# Patient Record
Sex: MALE | HISPANIC OR LATINO | Employment: OTHER | URBAN - METROPOLITAN AREA
[De-identification: names, ages, dates, MRNs, and addresses within clinical notes are randomized per-mention and may not be internally consistent; named-entity substitution may affect disease eponyms.]

---

## 2023-05-09 ENCOUNTER — APPOINTMENT (OUTPATIENT)
Dept: MRI IMAGING | Facility: HOSPITAL | Age: 48
End: 2023-05-09

## 2023-05-09 ENCOUNTER — APPOINTMENT (OUTPATIENT)
Dept: GENERAL RADIOLOGY | Facility: HOSPITAL | Age: 48
End: 2023-05-09

## 2023-05-09 ENCOUNTER — HOSPITAL ENCOUNTER (EMERGENCY)
Facility: HOSPITAL | Age: 48
Discharge: HOME | End: 2023-05-09
Attending: EMERGENCY MEDICINE | Admitting: EMERGENCY MEDICINE

## 2023-05-09 ENCOUNTER — APPOINTMENT (OUTPATIENT)
Dept: CT IMAGING | Facility: HOSPITAL | Age: 48
End: 2023-05-09

## 2023-05-09 VITALS
TEMPERATURE: 98.7 F | BODY MASS INDEX: 30.55 KG/M2 | OXYGEN SATURATION: 100 % | HEART RATE: 78 BPM | SYSTOLIC BLOOD PRESSURE: 145 MMHG | DIASTOLIC BLOOD PRESSURE: 101 MMHG | WEIGHT: 166.01 LBS | HEIGHT: 62 IN | RESPIRATION RATE: 20 BRPM

## 2023-05-09 DIAGNOSIS — G51.0 LEFT-SIDED BELL'S PALSY: Primary | ICD-10-CM

## 2023-05-09 LAB
ALBUMIN SERPL-MCNC: 4.4 G/DL (ref 3.5–5.2)
ALBUMIN/GLOB SERPL: 1.4 G/DL
ALP SERPL-CCNC: 138 U/L (ref 39–117)
ALT SERPL W P-5'-P-CCNC: 45 U/L (ref 1–41)
ANION GAP SERPL CALCULATED.3IONS-SCNC: 9.4 MMOL/L (ref 5–15)
APTT PPP: 33.5 SECONDS (ref 24.2–34.2)
AST SERPL-CCNC: 29 U/L (ref 1–40)
BASOPHILS # BLD AUTO: 0.07 10*3/MM3 (ref 0–0.2)
BASOPHILS NFR BLD AUTO: 1.1 % (ref 0–1.5)
BILIRUB SERPL-MCNC: 0.5 MG/DL (ref 0–1.2)
BILIRUB UR QL STRIP: NEGATIVE
BUN SERPL-MCNC: 9 MG/DL (ref 6–20)
BUN/CREAT SERPL: 11.3 (ref 7–25)
CALCIUM SPEC-SCNC: 9.8 MG/DL (ref 8.6–10.5)
CHLORIDE SERPL-SCNC: 102 MMOL/L (ref 98–107)
CLARITY UR: ABNORMAL
CO2 SERPL-SCNC: 26.6 MMOL/L (ref 22–29)
COLOR UR: YELLOW
CREAT SERPL-MCNC: 0.8 MG/DL (ref 0.76–1.27)
DEPRECATED RDW RBC AUTO: 36.5 FL (ref 37–54)
EGFRCR SERPLBLD CKD-EPI 2021: 109.2 ML/MIN/1.73
EOSINOPHIL # BLD AUTO: 0.74 10*3/MM3 (ref 0–0.4)
EOSINOPHIL NFR BLD AUTO: 12 % (ref 0.3–6.2)
ERYTHROCYTE [DISTWIDTH] IN BLOOD BY AUTOMATED COUNT: 11.9 % (ref 12.3–15.4)
GLOBULIN UR ELPH-MCNC: 3.1 GM/DL
GLUCOSE BLDC GLUCOMTR-MCNC: 110 MG/DL (ref 70–99)
GLUCOSE SERPL-MCNC: 107 MG/DL (ref 65–99)
GLUCOSE UR STRIP-MCNC: NEGATIVE MG/DL
HCT VFR BLD AUTO: 44.7 % (ref 37.5–51)
HGB BLD-MCNC: 16.1 G/DL (ref 13–17.7)
HGB UR QL STRIP.AUTO: NEGATIVE
HOLD SPECIMEN: NORMAL
HOLD SPECIMEN: NORMAL
IMM GRANULOCYTES # BLD AUTO: 0.01 10*3/MM3 (ref 0–0.05)
IMM GRANULOCYTES NFR BLD AUTO: 0.2 % (ref 0–0.5)
INR PPP: 1.02 (ref 0.86–1.15)
KETONES UR QL STRIP: NEGATIVE
LEUKOCYTE ESTERASE UR QL STRIP.AUTO: NEGATIVE
LYMPHOCYTES # BLD AUTO: 2.03 10*3/MM3 (ref 0.7–3.1)
LYMPHOCYTES NFR BLD AUTO: 32.9 % (ref 19.6–45.3)
MCH RBC QN AUTO: 30.6 PG (ref 26.6–33)
MCHC RBC AUTO-ENTMCNC: 36 G/DL (ref 31.5–35.7)
MCV RBC AUTO: 85 FL (ref 79–97)
MONOCYTES # BLD AUTO: 0.31 10*3/MM3 (ref 0.1–0.9)
MONOCYTES NFR BLD AUTO: 5 % (ref 5–12)
NEUTROPHILS NFR BLD AUTO: 3.01 10*3/MM3 (ref 1.7–7)
NEUTROPHILS NFR BLD AUTO: 48.8 % (ref 42.7–76)
NITRITE UR QL STRIP: NEGATIVE
NRBC BLD AUTO-RTO: 0 /100 WBC (ref 0–0.2)
PH UR STRIP.AUTO: 8 [PH] (ref 5–8)
PLATELET # BLD AUTO: 300 10*3/MM3 (ref 140–450)
PMV BLD AUTO: 9.7 FL (ref 6–12)
POTASSIUM SERPL-SCNC: 4 MMOL/L (ref 3.5–5.2)
PROT SERPL-MCNC: 7.5 G/DL (ref 6–8.5)
PROT UR QL STRIP: NEGATIVE
PROTHROMBIN TIME: 13.5 SECONDS (ref 11.8–14.9)
QT INTERVAL: 377 MS
RBC # BLD AUTO: 5.26 10*6/MM3 (ref 4.14–5.8)
SODIUM SERPL-SCNC: 138 MMOL/L (ref 136–145)
SP GR UR STRIP: 1.01 (ref 1–1.03)
TROPONIN T SERPL HS-MCNC: 7 NG/L
UROBILINOGEN UR QL STRIP: ABNORMAL
WBC NRBC COR # BLD: 6.17 10*3/MM3 (ref 3.4–10.8)
WHOLE BLOOD HOLD COAG: NORMAL
WHOLE BLOOD HOLD SPECIMEN: NORMAL

## 2023-05-09 PROCEDURE — 99222 1ST HOSP IP/OBS MODERATE 55: CPT

## 2023-05-09 PROCEDURE — 85610 PROTHROMBIN TIME: CPT | Performed by: EMERGENCY MEDICINE

## 2023-05-09 PROCEDURE — 81003 URINALYSIS AUTO W/O SCOPE: CPT | Performed by: EMERGENCY MEDICINE

## 2023-05-09 PROCEDURE — 85730 THROMBOPLASTIN TIME PARTIAL: CPT | Performed by: EMERGENCY MEDICINE

## 2023-05-09 PROCEDURE — 85025 COMPLETE CBC W/AUTO DIFF WBC: CPT | Performed by: EMERGENCY MEDICINE

## 2023-05-09 PROCEDURE — 82948 REAGENT STRIP/BLOOD GLUCOSE: CPT

## 2023-05-09 PROCEDURE — 80053 COMPREHEN METABOLIC PANEL: CPT | Performed by: EMERGENCY MEDICINE

## 2023-05-09 PROCEDURE — 36415 COLL VENOUS BLD VENIPUNCTURE: CPT

## 2023-05-09 PROCEDURE — 97161 PT EVAL LOW COMPLEX 20 MIN: CPT | Performed by: PHYSICAL THERAPIST

## 2023-05-09 PROCEDURE — 71045 X-RAY EXAM CHEST 1 VIEW: CPT

## 2023-05-09 PROCEDURE — 84484 ASSAY OF TROPONIN QUANT: CPT | Performed by: EMERGENCY MEDICINE

## 2023-05-09 PROCEDURE — 70551 MRI BRAIN STEM W/O DYE: CPT

## 2023-05-09 PROCEDURE — 70450 CT HEAD/BRAIN W/O DYE: CPT

## 2023-05-09 PROCEDURE — 99284 EMERGENCY DEPT VISIT MOD MDM: CPT

## 2023-05-09 PROCEDURE — 93005 ELECTROCARDIOGRAM TRACING: CPT | Performed by: EMERGENCY MEDICINE

## 2023-05-09 RX ORDER — METHYLPREDNISOLONE 4 MG/1
TABLET ORAL
Qty: 21 TABLET | Refills: 0 | Status: SHIPPED | OUTPATIENT
Start: 2023-05-09

## 2023-05-09 RX ORDER — VALACYCLOVIR HYDROCHLORIDE 1 G/1
1000 TABLET, FILM COATED ORAL 3 TIMES DAILY
Qty: 21 TABLET | Refills: 0 | Status: SHIPPED | OUTPATIENT
Start: 2023-05-09

## 2023-05-09 RX ORDER — SODIUM CHLORIDE 0.9 % (FLUSH) 0.9 %
10 SYRINGE (ML) INJECTION AS NEEDED
Status: DISCONTINUED | OUTPATIENT
Start: 2023-05-09 | End: 2023-05-09 | Stop reason: HOSPADM

## 2023-05-09 NOTE — ED PROVIDER NOTES
Time: 3:06 PM EDT  Date of encounter:  5/9/2023  Independent Historian/Clinical History and Information was obtained by:   Patient and Family  Chief Complaint: Left facial droop    History is limited by: N/A    History of Present Illness:  Patient is a 48 y.o. year old male who presents to the emergency department for evaluation of left facial droop.  Patient reports he woke up this morning with a left-sided facial droop.  Patient states he went to bed last night in his normal state of good health.  Patient does complain of some pain involving his left ear and more so behind his left ear.  Patient states he also had COVID approximately 2 weeks ago.  Patient denies any trauma or injury.  Patient is he is never had anything like this before.  Patient reports his left eye is irritated.  He also states that he feels like he is not blinking properly.  Patient denies any change in taste.    HPI    Patient Care Team  Primary Care Provider: Provider, No Known    Past Medical History:     No Known Allergies  Past Medical History:   Diagnosis Date   • Seasonal allergies      Past Surgical History:   Procedure Laterality Date   • HAND SURGERY       History reviewed. No pertinent family history.    Home Medications:  Prior to Admission medications    Medication Sig Start Date End Date Taking? Authorizing Provider   brompheniramine-pseudoephedrine-DM (Bromfed DM) 30-2-10 MG/5ML syrup Take 10 mL by mouth 4 (Four) Times a Day As Needed for Congestion, Cough or Allergies. 4/16/23   Mirian Wong APRN        Social History:   Social History     Tobacco Use   • Smoking status: Never     Passive exposure: Never   • Smokeless tobacco: Never   Vaping Use   • Vaping Use: Never used   Substance Use Topics   • Alcohol use: Yes     Comment: occ   • Drug use: Never         Review of Systems:  Review of Systems   Constitutional: Negative for chills and fever.   HENT: Negative for congestion, ear pain and sore throat.    Eyes:  "Negative for pain.   Respiratory: Negative for cough, chest tightness and shortness of breath.    Cardiovascular: Negative for chest pain.   Gastrointestinal: Negative for abdominal pain, diarrhea, nausea and vomiting.   Genitourinary: Negative for flank pain and hematuria.   Musculoskeletal: Negative for joint swelling.   Skin: Negative for pallor.   Neurological: Positive for facial asymmetry. Negative for seizures and headaches.   All other systems reviewed and are negative.       Physical Exam:  BP (!) 145/101   Pulse 78   Temp 98.7 °F (37.1 °C) (Oral)   Resp 20   Ht 157.5 cm (62\")   Wt 75.3 kg (166 lb 0.1 oz)   SpO2 100%   BMI 30.36 kg/m²     Physical Exam       Vital signs were reviewed under triage note.  General appearance - Patient appears well-developed and well-nourished.  Patient is in no acute distress.  Head - Normocephalic, atraumatic.  Pupils - Equal, round, reactive to light.  Extraocular muscles are intact.  Conjunctive is clear.  Nasal - Normal inspection.  No evidence of trauma or epistaxis.  Tympanic membranes - Gray, intact without erythema or retractions.  Oral mucosa - Pink and moist without lesions or erythema.  Uvula is midline.  Chest wall - Atraumatic.  Chest wall is nontender.  There is no vesicular rashes noted.  Neck - Supple.  Trachea was midline.  There is no palpable lymphadenopathy or thyromegaly.  There are no meningeal signs  Lungs - Clear to auscultation and percussion bilaterally.  Heart - Regular rate and rhythm without any murmurs, clicks, or gallops.  Abdomen - Soft.  Bowel sounds are present.  There is no palpable tenderness.  There is no rebound, guarding, or rigidity.  There are no palpable masses.  There are no pulsatile masses.  Back - Spine is straight and midline.  There is no CVA tenderness.  Extremities - Intact x4 with full range of motion.  There is no palpable edema.  Pulses are intact x4 and equal.  Neurologic - Patient is awake, alert, and oriented x3.  " Cranial nerves II through XII are grossly intact with the exception that patient has a left facial droop.  Patient is unable to wrinkle his left forehead.  Patient has a slow blink but is able to close his eyelids all the way.  Motor and sensory functions grossly intact.  Cerebellar function was normal.  Integument - There are no rashes.  There are no petechia or purpura lesions noted.  There are no vesicular lesions noted.      Procedures:  Procedures      Medical Decision Making:      Comorbidities that affect care:    Seasonal allergies    External Notes reviewed:    Previous Clinic Note: Urgent care note from 4/16/2023 was reviewed by me.      The following orders were placed and all results were independently analyzed by me:  Orders Placed This Encounter   Procedures   • CT Head Without Contrast Stroke Protocol   • XR Chest 1 View   • MRI Brain Without Contrast   • Loring Draw   • Comprehensive Metabolic Panel   • Protime-INR   • aPTT   • Single High Sensitivity Troponin T   • Urinalysis With Microscopic If Indicated (No Culture) - Urine, Clean Catch   • CBC Auto Differential   • NPO Diet NPO Type: Strict NPO   • Initiate Department's Acute Stroke Process (Team D, Code 19, etc)   • Perform NIH Stroke Scale   • Measure Actual Weight   • Head of Bed 30 Degrees or Less   • Undress and Gown   • Cardiac Monitoring   • Continuous Pulse Oximetry   • Vital Signs   • Neuro Checks   • Notify MD for SBP < 80 or > 200   • Notify Provider for SBP greater than 140 if hemorrhagic Stroke   • No Hypotonic Fluids   • Nursing Swallow Assessment   • Oxygen Therapy- Nasal Cannula; 2 LPM; Titrate for SPO2: equal to or greater than, 94%   • POC Glucose Once   • POC Glucose Once   • ECG 12 Lead ED Triage Standing Order; Acute Stroke (Onset <12 hrs)   • Type & Screen   • Insert Large Bore Peripheral IV - Right AC Preferred   • CBC & Differential   • Green Top (Gel)   • Lavender Top   • Gold Top - SST   • Light Blue Top        Medications Given in the Emergency Department:  Medications   sodium chloride 0.9 % flush 10 mL (has no administration in time range)        ED Course:    ED Course as of 05/10/23 2146   Wed May 10, 2023   2144 EKG performed at 1120 was interpreted by me to show normal sinus rhythm with a ventricular rate of 76 bpm.  The parable is 140 ms.  P waves are normal.  QRS interval is normal.  Axis is at 13 degrees.  There is no acute ischemic ST or T wave change identified.  QT corrected was 426 ms. [TB]      ED Course User Index  [TB] Jaxson Rachel DO   The patient was seen and evaluated in the ED by me.  The above history and physical exam was performed as documented.  Diagnostic data was obtained.  Results reviewed.  Discussed with the patient.  Patient came in through triage and was triaged as a stroke intervention.  Patient was seen by teleneurology.  Teleneurology placed a note.  They felt this was Bell's palsy but did recommend getting an MRI.  If the MRI was negative then treat as Bell's palsy and discharged home.  I agreed with the teleneurologist interpretation.  The plan was carried out.  MRI was negative.  Patient is stable for discharge home    Labs:    Lab Results (last 24 hours)     Procedure Component Value Units Date/Time    POC Glucose Once [[id number]]  (Abnormal) Collected: 05/09/23 1053    Specimen: Blood Updated: 05/09/23 1055     Glucose 110 mg/dL      Comment: Serial Number: [id number]:  [id number]       CBC & Differential [[id number]]  (Abnormal) Collected: 05/09/23 1108    Specimen: Blood Updated: 05/09/23 1114    Narrative:      The following orders were created for panel order CBC & Differential.  Procedure                               Abnormality         Status                     ---------                               -----------         ------                     CBC Auto Differential[[id number]]        Abnormal            Final result                 Please view results for these  tests on the individual orders.    Comprehensive Metabolic Panel [[id number]]  (Abnormal) Collected: 05/09/23 1108    Specimen: Blood Updated: 05/09/23 1136     Glucose 107 mg/dL      BUN 9 mg/dL      Creatinine 0.80 mg/dL      Sodium 138 mmol/L      Potassium 4.0 mmol/L      Comment: Slight hemolysis detected by analyzer. Results may be affected.        Chloride 102 mmol/L      CO2 26.6 mmol/L      Calcium 9.8 mg/dL      Total Protein 7.5 g/dL      Albumin 4.4 g/dL      ALT (SGPT) 45 U/L      AST (SGOT) 29 U/L      Comment: Slight hemolysis detected by analyzer. Results may be affected.        Alkaline Phosphatase 138 U/L      Total Bilirubin 0.5 mg/dL      Globulin 3.1 gm/dL      A/G Ratio 1.4 g/dL      BUN/Creatinine Ratio 11.3     Anion Gap 9.4 mmol/L      eGFR 109.2 mL/min/1.73     Narrative:      GFR Normal >60  Chronic Kidney Disease <60  Kidney Failure <15      Protime-INR [[id number]]  (Normal) Collected: 05/09/23 1108    Specimen: Blood Updated: 05/09/23 1134     Protime 13.5 Seconds      INR 1.02    Narrative:      Suggested Therapeutic Ranges For Oral Anticoagulant Therapy:  Level of Therapy                      INR Target Range  Standard Dose                            2.0-3.0  High Dose                                2.5-3.5  Patients not receiving anticoagulant  Therapy Normal Range                     0.86-1.15    aPTT [[id number]]  (Normal) Collected: 05/09/23 1108    Specimen: Blood Updated: 05/09/23 1134     PTT 33.5 seconds     Single High Sensitivity Troponin T [[id number]]  (Normal) Collected: 05/09/23 1108    Specimen: Blood Updated: 05/09/23 1133     HS Troponin T 7 ng/L     Narrative:      High Sensitive Troponin T Reference Range:  <10.0 ng/L- Negative Female for AMI  <15.0 ng/L- Negative Male for AMI  >=10 - Abnormal Female indicating possible myocardial injury.  >=15 - Abnormal Male indicating possible myocardial injury.   Clinicians would have to utilize clinical acumen, EKG, Troponin, and  serial changes to determine if it is an Acute Myocardial Infarction or myocardial injury due to an underlying chronic condition.         CBC Auto Differential [[id number]]  (Abnormal) Collected: 05/09/23 1108    Specimen: Blood Updated: 05/09/23 1114     WBC 6.17 10*3/mm3      RBC 5.26 10*6/mm3      Hemoglobin 16.1 g/dL      Hematocrit 44.7 %      MCV 85.0 fL      MCH 30.6 pg      MCHC 36.0 g/dL      RDW 11.9 %      RDW-SD 36.5 fl      MPV 9.7 fL      Platelets 300 10*3/mm3      Neutrophil % 48.8 %      Lymphocyte % 32.9 %      Monocyte % 5.0 %      Eosinophil % 12.0 %      Basophil % 1.1 %      Immature Grans % 0.2 %      Neutrophils, Absolute 3.01 10*3/mm3      Lymphocytes, Absolute 2.03 10*3/mm3      Monocytes, Absolute 0.31 10*3/mm3      Eosinophils, Absolute 0.74 10*3/mm3      Basophils, Absolute 0.07 10*3/mm3      Immature Grans, Absolute 0.01 10*3/mm3      nRBC 0.0 /100 WBC     Urinalysis With Microscopic If Indicated (No Culture) - Urine, Clean Catch [[id number]]  (Abnormal) Collected: 05/09/23 1205    Specimen: Urine, Clean Catch Updated: 05/09/23 1222     Color, UA Yellow     Appearance, UA Cloudy     pH, UA 8.0     Specific Gravity, UA 1.012     Glucose, UA Negative     Ketones, UA Negative     Bilirubin, UA Negative     Blood, UA Negative     Protein, UA Negative     Leuk Esterase, UA Negative     Nitrite, UA Negative     Urobilinogen, UA 0.2 E.U./dL    Narrative:      Urine microscopic not indicated.           Imaging:    MRI Brain Without Contrast    Result Date: 5/9/2023  PROCEDURE: MRI BRAIN WO CONTRAST  COMPARISON: None  INDICATIONS: LEFT SIDE FACIAL DROOP SINCE THIS AM      TECHNIQUE: A variety of imaging planes and parameters were utilized for visualization of suspected pathology.  Images were performed without contrast.  FINDINGS:  No restricted diffusion or abnormal susceptibility.  No edema or mass effect.  No signal abnormalities of the brain.  CSF spaces/ventricles within normal limits.   Cerebellar tonsils in normal position.  Pituitary normal in size.  Major intracranial flow voids present       No evidence of acute brain ischemia or other intracranial abnormality      EBONI KIRK MD       Electronically Signed and Approved By: EBONI KIRK MD on 5/09/2023 at 13:19             XR Chest 1 View    Result Date: 5/9/2023  PROCEDURE: XR CHEST 1 VW  COMPARISON: None  INDICATIONS: Acute Stroke Protocol (Onset < 12 hrs)  FINDINGS: Single view of the chest reveals no cardiac enlargement or pleural effusion.  There is no acute airspace disease.  Mediastinal contour unremarkable.  No definitive acute osseous abnormalities are seen on this single view.   IMPRESSION: No acute disease.   LUCIO CANO MD       Electronically Signed and Approved By: LUCIO CANO MD on 5/09/2023 at 11:42             CT Head Without Contrast Stroke Protocol    Result Date: 5/9/2023  PROCEDURE: CT HEAD WO CONTRAST STROKE PROTOCOL  COMPARISON:  None INDICATIONS: Neuro deficit, acute, stroke suspected,left facial droop  PROTOCOL:   Standard imaging protocol performed    RADIATION:   DLP: 1018.2mGy*cm   MA and/or KV was adjusted to minimize radiation dose.     TECHNIQUE: After obtaining the patient's consent, CT images were obtained without non-ionic intravenous contrast material.  FINDINGS:  The ventricles and CSF containing spaces are normal in size and configuration.  No intra or extra-axial mass lesions, fluid collections, or mass effect is seen.  No focal areas of low attenuation or acute intracranial hemorrhage are identified.  The paranasal sinuses and mastoid air cells are clear.       Normal noncontrast CT of the brain     Julio C Munoz MD       Electronically Signed and Approved By: Julio C Munoz MD on 5/09/2023 at 11:06                 Differential Diagnosis and Discussion:    Facial Pain/Swelling: Differential diagnosis includes but is not limited to temporal arteritis, intracranial tumors, neuralgias,  dental disease, ocular disease, TMJ syndrome, salivary gland disorders, sinusitis, cluster headaches, migraines, and psychogenic.    All labs were reviewed and interpreted by me.  All X-rays impressions were independently interpreted by me.  EKG was interpreted by me.  CT scan radiology impression was interpreted by me.  MRI impression was interpreted by me.     MDM         Patient Care Considerations:    Patient was evaluated for potential LVO.  This was worked up and ruled out by the teleneurologist. ANTIBIOTICS: I considered prescribing antibiotics as an outpatient however There is no evidence of acute bacterial infection.      Consultants/Shared Management Plan:    Consultant: I have discussed the case with Teleneurologist who agrees to consult on the patient.    Social Determinants of Health:    Patient has presented with family members who are responsible, reliable and will ensure follow up care.      Disposition and Care Coordination:    Discharged: I considered escalation of care by admitting this patient for observation, however the patient has improved and is suitable and  stable for discharge.    I have explained the patient´s condition, diagnoses and treatment plan based on the information available to me at this time. I have answered questions and addressed any concerns. The patient has a good  understanding of the patient´s diagnosis, condition, and treatment plan as can be expected at this point. The vital signs have been stable. The patient´s condition is stable and appropriate for discharge from the emergency department.      The patient will pursue further outpatient evaluation with the primary care physician or other designated or consulting physician as outlined in the discharge instructions. They are agreeable to this plan of care and follow-up instructions have been explained in detail. The patient has received these instructions in written format and have expressed an understanding of the  discharge instructions. The patient is aware that any significant change in condition or worsening of symptoms should prompt an immediate return to this or the closest emergency department or call to 911.  I have explained discharge medications and the need for follow up with the patient/caretakers. This was also printed in the discharge instructions. Patient was discharged with the following medications and follow up:      Medication List      New Prescriptions    methylPREDNISolone 4 MG dose pack  Commonly known as: MEDROL  Take as directed on package instructions.     valACYclovir 1000 MG tablet  Commonly known as: VALTREX  Take 1 tablet by mouth 3 (Three) Times a Day.           Where to Get Your Medications      These medications were sent to [id number] - [place], [place] - [address] AT [facility] - [phone] [facility] [phone]   [address] [place] [address]    Phone: [phone]   · methylPREDNISolone 4 MG dose pack  · valACYclovir 1000 MG tablet        Follow-up with your primary care provider in 10 to 14 days if symptoms or not improving.           Final diagnoses:   Left-sided Bell's palsy        ED Disposition     ED Disposition   Discharge    Condition   Stable    Comment   --             This medical record created using voice recognition software.           Jaxson Rachel DO  05/10/23 0111

## 2023-05-09 NOTE — CONSULTS
[facility]      Patient Name:   elsa back  YOB: 1975  Identification Number:   MRN - [id number]  Date of Service:   05/09/2023 10:57:58    Diagnosis:      •  R29.810 - Facial numbness/ Facial weakness    Impression:      • this is a 48-year-old male who presents with weakness left side of his face. Symptoms are most likely due to Bell's palsy. However there is a typical history in that he feels like the eye muscles involving lid closure on the left eye started getting weak a couple weeks ago and then had weakness of the rest of his face developed since yesterday.   For that reason he should have an MRI that had to evaluate compressive mass or other lesion affecting the left facial nerve. If that is negative then he can be treated with 60 mg of prednisone for a week, Valtrex for a week lubricating eye ointment at night, neurology follow-up as an outpatient a couple weeks. Obviously if the MRI of the brain shows abnormalities then he will need inpatient evaluation for those      Sign Out:      •  Discussed with Emergency Department Provider        ------------------------------------------------------------------------------    Advanced Imaging:  Advanced Imaging Deferred because:    Non-disabling symptoms as verified by the patient; no cortical signs so not consistent with LVO      Metrics:  Last Known Well: 04/25/2023 12:00:00  [facility] Notification Time: 05/09/2023 10:56:52  Arrival Time: 05/09/2023 10:47:00  Stamp Time: 05/09/2023 10:57:58  Initial Response Time: 05/09/2023 10:59:29  Symptoms: facial weakness.  NIHSS Start Assessment Time: 05/09/2023 11:10:06  Patient is not a candidate for Thrombolytic.  Thrombolytic Medical Decision: 05/09/2023 11:03:03  Patient was not deemed candidate for Thrombolytic because of following reasons:  Last Well Known Above 4.5 Hours.    I personally Reviewed the CT Head and it Showed no acute  findings    Primary Provider Notified of Diagnostic Impression and Management Plan on: 05/09/2023 11:14:56        ------------------------------------------------------------------------------    History of Present Illness:  Patient is a 48 year old Male.    Patient was brought by private transportation with symptoms of facial weakness.  this is a 48-year-old male who presents with weakness left side of his face. he states that he was having trouble closing his left eye for a couple weeks, and in the interim developed covid, then yesterday he developed some pain behind his ear and some weakness of the lower portion of the left side of his face  He has no numbness or weakness of the arms or legs and no speech problems      Past Medical History:  Othere PMH:  covid    Medications:    No Anticoagulant use   No Antiplatelet use  Reviewed EMR for current medications    Allergies:   Reviewed    Social History:  Drug Use: No    Family History:    There is no family history of premature cerebrovascular disease pertinent to this consultation    ROS :  14 Points Review of Systems was performed and was negative except mentioned in HPI.    Past Surgical History:  There Is No Surgical History Contributory To Today’s Visit        Examination:  BP(172//100), Pulse(75), Blood Glucose(111)  1A: Level of Consciousness - Alert; keenly responsive + 0  1B: Ask Month and Age - Both Questions Right + 0  1C: Blink Eyes & Squeeze Hands - Performs Both Tasks + 0  2: Test Horizontal Extraocular Movements - Normal + 0  3: Test Visual Fields - No Visual Loss + 0  4: Test Facial Palsy (Use Grimace if Obtunded) - Unilateral Complete paralysis (upper/lower face) + 3  5A: Test Left Arm Motor Drift - No Drift for 10 Seconds + 0  5B: Test Right Arm Motor Drift - No Drift for 10 Seconds + 0  6A: Test Left Leg Motor Drift - No Drift for 5 Seconds + 0  6B: Test Right Leg Motor Drift - No Drift for 5 Seconds + 0  7: Test Limb Ataxia (FNF/Heel-Shin) - No  Ataxia + 0  8: Test Sensation - Normal; No sensory loss + 0  9: Test Language/Aphasia - Normal; No aphasia + 0  10: Test Dysarthria - Normal + 0  11: Test Extinction/Inattention - No abnormality + 0    NIHSS Score: 3      Pre-Morbid Modified [place] Scale:  0 Points = No symptoms at all    I reviewed the available imaging via A.I. software Rapid and initiated discussion with the primary provider    Patient/Family was informed the Neurology Consult would occur via TeleHealth consult by way of interactive audio and video telecommunications and consented to receiving care in this manner.      Patient is being evaluated for possible acute neurologic impairment and high probability of imminent or life-threatening deterioration. I spent total of 25 minutes providing care to this patient, including time for face to face visit via telemedicine, review of medical records, imaging studies and discussion of findings with providers, the patient and/or family.      Dr Scott Miller      [facility]  [phone]      Case [id number]

## 2023-05-09 NOTE — ED NOTES
Patient reports left sided facial droop apparent upon waking at 0530 this morning.   Patient reports experiencing neck and ear pain last night before bed.  Negative extremity drifts.  No vision loss.

## 2023-05-09 NOTE — DISCHARGE INSTRUCTIONS
Follow the facial muscle exercises given to you by the physical therapist.  Take the prescriptions as prescribed.  Follow-up your primary care provider in approximate 2 weeks if symptoms have not resolved.  Return to the ER for any new or worsening symptoms especially develop any numbness tingling or weakness to your extremities.

## 2023-05-09 NOTE — THERAPY EVALUATION
Patient Name: Josemanuel Mckeon  : 1975    MRN: [id number]                              Today's Date: 2023       Admit Date: 2023    Visit Dx:     ICD-10-CM ICD-9-CM   1. Left-sided Bell's palsy  G51.0 351.0     There is no problem list on file for this patient.    Past Medical History:   Diagnosis Date   • Seasonal allergies      Past Surgical History:   Procedure Laterality Date   • HAND SURGERY        General Information     Row Name 23 1521          Physical Therapy Time and Intention    Document Type evaluation  -LR     Mode of Treatment individual therapy  -LR     Row Name 23 1521          General Information    Patient Profile Reviewed yes  -LR     Prior Level of Function independent:  -LR           User Key  (r) = Recorded By, (t) = Taken By, (c) = Cosigned By    Initials Name Provider Type    LR Leana Tesfaye PT Physical Therapist              History: Pt reports he woke up this morning and noticed he couldn't use the left side of his face.  He states it has become worse since being here.      Objective:  Facial Motor Function:  Raising both eyebrows: unable to raise on L side, normal on R  Closing both eyes tightly: unable to completely close on L side, normal on R  Smile: unable on L side, normal on R  Purse the lips: unable on L side, normal on R    Assessment/Plan:   Pt presents with a diagnosis of L facial weakness and has Bell's palsy that are limiting his ability to smile, close his eyes, and raise his eyebrows.  The patient was educated on facial exercises to perform twice daily to help regain function.  Pt was provided with a HEP handout.     Goals:   LTG 1: The patient will be independent in HEP in order to decrease pain and improve tolerance to functional activities.  STATUS: Met    Interventions:   Manual Therapy: not performed    Therapeutic Exercises: HEP: smiling, closing eyes, lifting eyebrows, scrunching eyebrows together, frowning, puffing face,  pursing lips    Modalities: not performed   Outcome Measures     Row Name 05/09/23 1522          Optimal Instrument    Optimal Instrument Optimal - Primary  -LR     From the list of three activities, choose the primary activity you would like to be able to do without any difficulty --  smiling:  unable to perform  -LR           User Key  (r) = Recorded By, (t) = Taken By, (c) = Cosigned By    Initials Name Provider Type    LR Leana Tesfaye, PT Physical Therapist                 05/09/23 1523   PT Evaluation Complexity   History, PT Evaluation Complexity no personal factors and/or comorbidities   Examination of Body Systems (PT Eval Complexity) 1-2 elements   Clinical Presentation (PT Evaluation Complexity) stable   Clinical Decision Making (PT Evaluation Complexity) low complexity   Overall Complexity (PT Evaluation Complexity) low complexity      Time Calculation:    PT Charges     Row Name 05/09/23 1523             Time Calculation    PT Received On 05/09/23  -LR         Time Calculation- PT    Total Timed Code Minutes- PT 8 minute(s)  -LR         Timed Charges    49064 - PT Therapeutic Exercise Minutes 2  -LR         Untimed Charges    PT Eval/Re-eval Minutes 6  -LR         Total Minutes    Timed Charges Total Minutes 2  -LR      Untimed Charges Total Minutes 6  -LR       Total Minutes 8  -LR            User Key  (r) = Recorded By, (t) = Taken By, (c) = Cosigned By    Initials Name Provider Type    LR Leana Tesfaye, PT Physical Therapist              Therapy Charges for Today     Code Description Service Date Service Provider Modifiers Qty    63923008986 HC PT EVAL LOW COMPLEXITY 1 5/9/2023 Leana Tesfaye, PT GP 1                  Leana Tesfaye PT  5/9/2023